# Patient Record
Sex: FEMALE | Race: WHITE | Employment: FULL TIME | URBAN - METROPOLITAN AREA
[De-identification: names, ages, dates, MRNs, and addresses within clinical notes are randomized per-mention and may not be internally consistent; named-entity substitution may affect disease eponyms.]

---

## 2022-03-18 PROBLEM — F41.9 ANXIETY: Status: ACTIVE | Noted: 2018-03-02

## 2022-03-19 PROBLEM — E66.01 SEVERE OBESITY WITH BODY MASS INDEX (BMI) OF 35.0 TO 39.9 WITH SERIOUS COMORBIDITY (HCC): Status: ACTIVE | Noted: 2018-09-21

## 2023-06-28 RX ORDER — SERTRALINE HYDROCHLORIDE 100 MG/1
100 TABLET, FILM COATED ORAL DAILY
Qty: 30 TABLET | Refills: 11 | Status: SHIPPED | OUTPATIENT
Start: 2023-06-28

## 2024-04-01 ENCOUNTER — HOSPITAL ENCOUNTER (EMERGENCY)
Age: 59
Discharge: HOME | End: 2024-04-01
Attending: GENERAL PRACTICE
Payer: COMMERCIAL

## 2024-04-01 ENCOUNTER — APPOINTMENT (OUTPATIENT)
Dept: GENERAL RADIOLOGY | Age: 59
End: 2024-04-01
Payer: COMMERCIAL

## 2024-04-01 VITALS
WEIGHT: 191 LBS | DIASTOLIC BLOOD PRESSURE: 82 MMHG | HEIGHT: 66 IN | TEMPERATURE: 97.9 F | BODY MASS INDEX: 30.7 KG/M2 | SYSTOLIC BLOOD PRESSURE: 131 MMHG | RESPIRATION RATE: 19 BRPM | OXYGEN SATURATION: 96 % | HEART RATE: 60 BPM

## 2024-04-01 DIAGNOSIS — R07.89 ATYPICAL CHEST PAIN: Primary | ICD-10-CM

## 2024-04-01 LAB
ANION GAP SERPL CALC-SCNC: 3 MMOL/L (ref 2–11)
BASOPHILS # BLD: 0 K/UL (ref 0–0.2)
BASOPHILS NFR BLD: 1 % (ref 0–2)
BUN SERPL-MCNC: 22 MG/DL (ref 6–23)
CALCIUM SERPL-MCNC: 9.4 MG/DL (ref 8.3–10.4)
CHLORIDE SERPL-SCNC: 109 MMOL/L (ref 103–113)
CO2 SERPL-SCNC: 25 MMOL/L (ref 21–32)
CREAT SERPL-MCNC: 0.9 MG/DL (ref 0.6–1)
DIFFERENTIAL METHOD BLD: NORMAL
EKG ATRIAL RATE: 77 BPM
EKG DIAGNOSIS: NORMAL
EKG P AXIS: 55 DEGREES
EKG P-R INTERVAL: 136 MS
EKG Q-T INTERVAL: 390 MS
EKG QRS DURATION: 70 MS
EKG QTC CALCULATION (BAZETT): 441 MS
EKG R AXIS: 41 DEGREES
EKG T AXIS: 30 DEGREES
EKG VENTRICULAR RATE: 77 BPM
EOSINOPHIL # BLD: 0.1 K/UL (ref 0–0.8)
EOSINOPHIL NFR BLD: 2 % (ref 0.5–7.8)
ERYTHROCYTE [DISTWIDTH] IN BLOOD BY AUTOMATED COUNT: 13.5 % (ref 11.9–14.6)
GLUCOSE SERPL-MCNC: 83 MG/DL (ref 65–100)
HCT VFR BLD AUTO: 41.5 % (ref 35.8–46.3)
HGB BLD-MCNC: 13.6 G/DL (ref 11.7–15.4)
IMM GRANULOCYTES # BLD AUTO: 0 K/UL (ref 0–0.5)
IMM GRANULOCYTES NFR BLD AUTO: 0 % (ref 0–5)
LYMPHOCYTES # BLD: 0.9 K/UL (ref 0.5–4.6)
LYMPHOCYTES NFR BLD: 19 % (ref 13–44)
MCH RBC QN AUTO: 29.2 PG (ref 26.1–32.9)
MCHC RBC AUTO-ENTMCNC: 32.8 G/DL (ref 31.4–35)
MCV RBC AUTO: 89.1 FL (ref 82–102)
MONOCYTES # BLD: 0.6 K/UL (ref 0.1–1.3)
MONOCYTES NFR BLD: 12 % (ref 4–12)
NEUTS SEG # BLD: 3.2 K/UL (ref 1.7–8.2)
NEUTS SEG NFR BLD: 66 % (ref 43–78)
NRBC # BLD: 0 K/UL (ref 0–0.2)
PLATELET # BLD AUTO: 217 K/UL (ref 150–450)
PMV BLD AUTO: 10.9 FL (ref 9.4–12.3)
POTASSIUM SERPL-SCNC: 4.1 MMOL/L (ref 3.5–5.1)
RBC # BLD AUTO: 4.66 M/UL (ref 4.05–5.2)
SODIUM SERPL-SCNC: 137 MMOL/L (ref 136–146)
TROPONIN I SERPL HS-MCNC: 3.6 PG/ML (ref 0–14)
TROPONIN I SERPL HS-MCNC: 3.6 PG/ML (ref 0–14)
WBC # BLD AUTO: 4.8 K/UL (ref 4.3–11.1)

## 2024-04-01 PROCEDURE — 6360000002 HC RX W HCPCS: Performed by: GENERAL PRACTICE

## 2024-04-01 PROCEDURE — 80048 BASIC METABOLIC PNL TOTAL CA: CPT

## 2024-04-01 PROCEDURE — 93005 ELECTROCARDIOGRAM TRACING: CPT | Performed by: GENERAL PRACTICE

## 2024-04-01 PROCEDURE — 99285 EMERGENCY DEPT VISIT HI MDM: CPT

## 2024-04-01 PROCEDURE — 96374 THER/PROPH/DIAG INJ IV PUSH: CPT

## 2024-04-01 PROCEDURE — 84484 ASSAY OF TROPONIN QUANT: CPT

## 2024-04-01 PROCEDURE — 85025 COMPLETE CBC W/AUTO DIFF WBC: CPT

## 2024-04-01 PROCEDURE — 93010 ELECTROCARDIOGRAM REPORT: CPT | Performed by: INTERNAL MEDICINE

## 2024-04-01 PROCEDURE — 71046 X-RAY EXAM CHEST 2 VIEWS: CPT

## 2024-04-01 RX ORDER — KETOROLAC TROMETHAMINE 15 MG/ML
15 INJECTION, SOLUTION INTRAMUSCULAR; INTRAVENOUS ONCE
Status: COMPLETED | OUTPATIENT
Start: 2024-04-01 | End: 2024-04-01

## 2024-04-01 RX ADMIN — KETOROLAC TROMETHAMINE 15 MG: 15 INJECTION, SOLUTION INTRAMUSCULAR; INTRAVENOUS at 17:03

## 2024-04-01 ASSESSMENT — PAIN DESCRIPTION - DESCRIPTORS: DESCRIPTORS: TIGHTNESS;PRESSURE

## 2024-04-01 ASSESSMENT — PAIN DESCRIPTION - LOCATION
LOCATION: CHEST
LOCATION: CHEST

## 2024-04-01 ASSESSMENT — LIFESTYLE VARIABLES
HOW OFTEN DO YOU HAVE A DRINK CONTAINING ALCOHOL: NEVER
HOW MANY STANDARD DRINKS CONTAINING ALCOHOL DO YOU HAVE ON A TYPICAL DAY: PATIENT DOES NOT DRINK

## 2024-04-01 ASSESSMENT — PAIN DESCRIPTION - ORIENTATION: ORIENTATION: RIGHT

## 2024-04-01 ASSESSMENT — PAIN SCALES - GENERAL
PAINLEVEL_OUTOF10: 4
PAINLEVEL_OUTOF10: 2

## 2024-04-01 NOTE — ED PROVIDER NOTES
Emergency Department Provider Note       PCP: Unknown, Provider, DO   Age: 58 y.o.   Sex: female     DISPOSITION Decision To Discharge 04/01/2024 05:30:06 PM       ICD-10-CM    1. Atypical chest pain  R07.89 [facility] [place]          Medical Decision Making     Patient presents with chest pain.  Chest pain is atypical and right-sided.  She has had this for over a year.  There is no exertional component patient's EKG here is normal.  She has had 2 troponins that are also normal.  Will consider other cardiopulmonary emergency such as CHF, PE, TAD.  I doubt any other cardiopulmonary emergency.  Patient's oxygen saturation is 95 to 97% on room air she has no tachycardia and no pleuritic component or persistent shortness of breath.  Nevertheless, she is 58 years old and I believe she should see cardiology as an outpatient.  Placed a referral to [facility] cardiology and sent a message to the      1 or more acute illnesses that pose a threat to life or bodily function.   Patient was discharged risks and benefits of hospitalization were considered.  Chronic medical problems impacting care include anxiety and reflux.  Shared medical decision making was utilized in creating the patients health plan today.    I independently ordered and reviewed each unique test.  I reviewed external records: provider visit note from PCP.  I reviewed external records: previous lab results from outside ED.  I reviewed external records: previous imaging study including radiologist interpretation.     I interpreted the X-rays chest x-ray shows no evidence of infiltrate or edema, mediastinum and heart size are normal.  I reviewed and agree with radiology report.  My Independent EKG Interpretation: sinus rhythm, no evidence of arrhythmia      ST Segments:Normal ST segments - NO STEMI   Rate: 77      Exclusion criteria - the patient is NOT to be included for SEP-1 Core Measure due to: Infection is not suspected

## 2024-04-01 NOTE — DISCHARGE SUMMARY
Patient mobility status  with no difficulty. Provider aware     I have reviewed discharge instructions with the patient and spouse.  The patient and spouse verbalized understanding.    Patient left ED via Discharge Method: ambulatory to Home with Spouse.    Opportunity for questions and clarification provided.     Patient given 0 scripts.

## 2024-04-01 NOTE — ED TRIAGE NOTES
Pt reports right-sided chest pain that began 45 minutes ago that radiated to right jaw.  Pt reports pain has decreased to 2/10 now.  Pt reports tingling dizziness.

## 2024-04-30 ENCOUNTER — INITIAL CONSULT (OUTPATIENT)
Age: 59
End: 2024-04-30
Payer: COMMERCIAL

## 2024-04-30 VITALS
WEIGHT: 200.8 LBS | DIASTOLIC BLOOD PRESSURE: 72 MMHG | HEIGHT: 66 IN | HEART RATE: 58 BPM | BODY MASS INDEX: 32.27 KG/M2 | SYSTOLIC BLOOD PRESSURE: 110 MMHG

## 2024-04-30 DIAGNOSIS — R07.9 CHEST PAIN, UNSPECIFIED TYPE: Primary | ICD-10-CM

## 2024-04-30 PROCEDURE — 93000 ELECTROCARDIOGRAM COMPLETE: CPT | Performed by: INTERNAL MEDICINE

## 2024-04-30 PROCEDURE — 99203 OFFICE O/P NEW LOW 30 MIN: CPT | Performed by: INTERNAL MEDICINE

## 2024-04-30 ASSESSMENT — ENCOUNTER SYMPTOMS
SPUTUM PRODUCTION: 0
HOARSE VOICE: 0
BACK PAIN: 0
DIARRHEA: 0
NAUSEA: 0
COUGH: 0
ABDOMINAL PAIN: 0
WHEEZING: 0
BLURRED VISION: 0
HEMATEMESIS: 0
HEMATOCHEZIA: 0
ORTHOPNEA: 0
BOWEL INCONTINENCE: 0
HEMOPTYSIS: 0
COLOR CHANGE: 0
SHORTNESS OF BREATH: 0
VOMITING: 0

## 2024-04-30 NOTE — PROGRESS NOTES
[facility] CARDIOLOGY  [address] [place] [address], [facility] [address]  [place] [address]  PHONE: [phone]        24    NAME:  Melina Benitez  : 1965  MRN: [id number]       SUBJECTIVE:   Melina Benitez is a 58 y.o. female seen for a consultation visit regarding the following: The patient is referred by Dr Pennington in the ED for evaluation of chest pain.She was seen in the ER on 2024 with unexplained chest pain.She had a negative ER evaluation including negative EKG and troponin levels x 2.She reports a 1 year hx of intermittent unexplained chest pain.Apparently,she was at work ( RN) and experienced chest pain which did not resolve with rest.This resulted in  the ER visit.    Chief Complaint   Patient presents with    Chest Pain    Follow-Up from Hospital            HPI:  Consultation is requested by [unfilled] for evaluation of Chest Pain and Follow-Up from Hospital   .    Chest Pain   This is a recurrent problem. Episode onset: ~ 1 year. The onset quality is gradual. Episode frequency: several times /week. Pain location: right chest and radiates to left chest and right neck. The pain is at a severity of 2/10. The pain is mild. The quality of the pain is described as squeezing (Pain lasts 1-2 minutes). Associated symptoms include malaise/fatigue. Pertinent negatives include no abdominal pain, back pain, claudication, cough, diaphoresis, dizziness, exertional chest pressure, fever, headaches, hemoptysis, irregular heartbeat, leg pain, lower extremity edema, nausea, near-syncope, numbness, orthopnea, palpitations, PND, shortness of breath, sputum production, syncope, vomiting or weakness. Associated with: She has blamed her symptoms on stress.Her son and his family have moved in the patient and her . She has tried rest for the symptoms. The treatment provided significant relief.           Past Medical History, Past Surgical History, Family history, Social History, and Medications were all reviewed

## 2024-05-20 ENCOUNTER — TELEPHONE (OUTPATIENT)
Age: 59
End: 2024-05-20

## 2024-05-20 NOTE — TELEPHONE ENCOUNTER
Continue with the plan.Report to ER if unstable chest pain pattern occurs.  Received: Today  Neville Lenz MD Brizendine, Kyler, LPN  Caller: Unspecified (Today,  8:12 AM)

## 2024-05-20 NOTE — TELEPHONE ENCOUNTER
Pt called with c/o CP    Onset: 7am 5/20/2024    BP at time of episode: 140/86    Duration:40-45mins    Pain: 7,8 out of 0-10    SX:squeezing, hard pressure, Radiated across her chest.       Pt took one nitro and pain was relived in 5-10mins. Pt denies n/v, dizziness    Last seen 4/30/2024  Next appt 5/29/2024 (stress test)

## 2024-05-20 NOTE — TELEPHONE ENCOUNTER
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 Pt states she did not have any dizziness or nausea. Pt states that she made need a script for nitro. Pt would appreciate a call back

## 2024-06-11 ENCOUNTER — OFFICE VISIT (OUTPATIENT)
Age: 59
End: 2024-06-11

## 2024-06-11 VITALS
HEIGHT: 66 IN | HEART RATE: 60 BPM | DIASTOLIC BLOOD PRESSURE: 72 MMHG | BODY MASS INDEX: 32.14 KG/M2 | SYSTOLIC BLOOD PRESSURE: 118 MMHG | WEIGHT: 200 LBS

## 2024-06-11 DIAGNOSIS — R07.9 CHEST PAIN, UNSPECIFIED TYPE: Primary | ICD-10-CM

## 2024-06-11 RX ORDER — ASPIRIN 81 MG/1
81 TABLET ORAL DAILY
COMMUNITY

## 2024-06-11 ASSESSMENT — ENCOUNTER SYMPTOMS
COUGH: 0
COLOR CHANGE: 0
BOWEL INCONTINENCE: 0
WHEEZING: 0
DIARRHEA: 0
HEMATEMESIS: 0
ABDOMINAL PAIN: 0
BLURRED VISION: 0
NAUSEA: 0
VOMITING: 0
SHORTNESS OF BREATH: 0
HEMOPTYSIS: 0
SPUTUM PRODUCTION: 0
HOARSE VOICE: 0
HEMATOCHEZIA: 0
ORTHOPNEA: 0
BACK PAIN: 0

## 2024-06-11 NOTE — PROGRESS NOTES
[facility] CARDIOLOGY  [address] [place] [address], [address]  [place] [address]  PHONE: [phone]        24        NAME:  Melina Benitez  : 1965  MRN: [id number]       SUBJECTIVE:   Melina Benitez is a 58 y.o. female seen for a follow up visit regarding the following: Chest pain.Recently,the patient was referred for evaluation of chest pain.She had a normal follow up stress MPI scan ( normal LV perfusion and LV EF=73%).Follow up echo revealed normal LV function with mild MR and WA,and mild to moderate TR with RVSP=22.She returns to discuss test results.I discussed test results with the patient and her .    Chief Complaint   Patient presents with    Chest Pain    Results     Clite, echo        HPI:    Chest Pain   This is a recurrent problem. The problem has been resolved. Pertinent negatives include no abdominal pain, back pain, claudication, cough, diaphoresis, dizziness, exertional chest pressure, fever, headaches, hemoptysis, irregular heartbeat, leg pain, lower extremity edema, malaise/fatigue, nausea, near-syncope, numbness, orthopnea, palpitations, PND, shortness of breath, sputum production, syncope, vomiting or weakness.       Past Medical History, Past Surgical History, Family history, Social History, and Medications were all reviewed with the patient today and updated as necessary.         Current Outpatient Medications:     aspirin 81 MG EC tablet, Take 1 tablet by mouth daily, Disp: , Rfl:     sertraline (ZOLOFT) 100 MG tablet, Take 1 tablet by mouth daily, Disp: 30 tablet, Rfl: 11    famotidine (PEPCID) 20 MG tablet, Take 1 tablet by mouth 2 times daily, Disp: , Rfl:   Allergies   Allergen Reactions    Sulfa Antibiotics Rash, Shortness Of Breath and Swelling     Past Medical History:   Diagnosis Date    Cervicalgia     Depressive disorder, not elsewhere classified     Esophageal reflux     Overweight(278.02)     Ulcer of the stomach and intestine     stomach only

## 2025-04-22 ENCOUNTER — TRANSCRIBE ORDERS (OUTPATIENT)
Dept: SCHEDULING | Age: 60
End: 2025-04-22

## 2025-04-22 DIAGNOSIS — K57.92 ACUTE DIVERTICULITIS: Primary | ICD-10-CM
